# Patient Record
Sex: MALE | Race: WHITE | HISPANIC OR LATINO | URBAN - METROPOLITAN AREA
[De-identification: names, ages, dates, MRNs, and addresses within clinical notes are randomized per-mention and may not be internally consistent; named-entity substitution may affect disease eponyms.]

---

## 2022-11-14 ENCOUNTER — OFFICE VISIT (OUTPATIENT)
Dept: URBAN - METROPOLITAN AREA CLINIC 68 | Facility: CLINIC | Age: 72
End: 2022-11-14

## 2022-11-14 RX ORDER — SERTRALINE HYDROCHLORIDE 50 MG/1
TAKE 1 TABLET BY MOUTH IN THE MORNING TABLET ORAL
Qty: 30 EACH | Refills: 2 | Status: ON HOLD | COMMUNITY

## 2022-11-14 RX ORDER — ATORVASTATIN CALCIUM 20 MG/1
TAKE 1 TABLET BY MOUTH ONCE DAILY FOR 90 DAYS TABLET, FILM COATED ORAL
Qty: 90 EACH | Refills: 0 | Status: ACTIVE | COMMUNITY

## 2022-11-14 RX ORDER — DONEPEZIL HYDROCHLORIDE 10 MG/1
TABLET, FILM COATED ORAL
Qty: 30 TABLET | Status: ON HOLD | COMMUNITY

## 2022-11-14 RX ORDER — GABAPENTIN 100 MG/1
CAPSULE ORAL
Qty: 60 CAPSULE | Status: ON HOLD | COMMUNITY

## 2022-11-14 RX ORDER — SOD SULF/POT CHLORIDE/MAG SULF 1.479 G
AS DIRECTED TABLET ORAL ONCE
Qty: 1 PACKET | Refills: 0 | OUTPATIENT
Start: 2022-11-14

## 2022-11-14 RX ORDER — LISINOPRIL 20 MG/1
TABLET ORAL
Qty: 60 TABLET | Status: ACTIVE | COMMUNITY

## 2022-11-14 NOTE — HPI-MIGRATED HPI
Transition of Care : Patient evaluated due to colon polyps. Referred last colonoscopy was on 2017 and had polyps.  Denies nausea, vomits, dysphagia, odynophagia, heartburn, abdominal pain, diarrhea, constipation GI bleeding or weight loss

## 2022-12-07 ENCOUNTER — OFFICE VISIT (OUTPATIENT)
Dept: URBAN - METROPOLITAN AREA SURGERY CENTER 12 | Facility: SURGERY CENTER | Age: 72
End: 2022-12-07

## 2022-12-07 ENCOUNTER — DASHBOARD ENCOUNTERS (OUTPATIENT)
Age: 72
End: 2022-12-07

## 2022-12-07 RX ORDER — LISINOPRIL 20 MG/1
TABLET ORAL
Qty: 60 TABLET | Status: ACTIVE | COMMUNITY

## 2022-12-07 RX ORDER — GABAPENTIN 100 MG/1
CAPSULE ORAL
Qty: 60 CAPSULE | Status: ON HOLD | COMMUNITY

## 2022-12-07 RX ORDER — DONEPEZIL HYDROCHLORIDE 10 MG/1
TABLET, FILM COATED ORAL
Qty: 30 TABLET | Status: ON HOLD | COMMUNITY

## 2022-12-07 RX ORDER — SERTRALINE HYDROCHLORIDE 50 MG/1
TAKE 1 TABLET BY MOUTH IN THE MORNING TABLET ORAL
Qty: 30 EACH | Refills: 2 | Status: ON HOLD | COMMUNITY

## 2022-12-07 RX ORDER — ATORVASTATIN CALCIUM 20 MG/1
TAKE 1 TABLET BY MOUTH ONCE DAILY FOR 90 DAYS TABLET, FILM COATED ORAL
Qty: 90 EACH | Refills: 0 | Status: ACTIVE | COMMUNITY

## 2022-12-07 RX ORDER — SOD SULF/POT CHLORIDE/MAG SULF 1.479 G
AS DIRECTED TABLET ORAL ONCE
Qty: 1 PACKET | Refills: 0 | Status: ACTIVE | COMMUNITY
Start: 2022-11-14